# Patient Record
Sex: FEMALE
[De-identification: names, ages, dates, MRNs, and addresses within clinical notes are randomized per-mention and may not be internally consistent; named-entity substitution may affect disease eponyms.]

---

## 2018-08-27 ENCOUNTER — HOSPITAL ENCOUNTER (EMERGENCY)
Dept: HOSPITAL 5 - ED | Age: 53
Discharge: HOME | End: 2018-08-27
Payer: MEDICAID

## 2018-08-27 VITALS — SYSTOLIC BLOOD PRESSURE: 161 MMHG | DIASTOLIC BLOOD PRESSURE: 84 MMHG

## 2018-08-27 DIAGNOSIS — J45.909: Primary | ICD-10-CM

## 2018-08-27 DIAGNOSIS — M54.9: ICD-10-CM

## 2018-08-27 DIAGNOSIS — F17.200: ICD-10-CM

## 2018-08-27 DIAGNOSIS — I10: ICD-10-CM

## 2018-08-27 DIAGNOSIS — M19.90: ICD-10-CM

## 2018-08-27 DIAGNOSIS — Z79.899: ICD-10-CM

## 2018-08-27 DIAGNOSIS — G89.29: ICD-10-CM

## 2018-08-27 PROCEDURE — 99282 EMERGENCY DEPT VISIT SF MDM: CPT

## 2018-08-27 NOTE — EMERGENCY DEPARTMENT REPORT
ED General Adult HPI





- General


Chief complaint: Back Pain/Injury


Stated complaint: COUGH/BACK PAIN


Time Seen by Provider: 08/27/18 14:33


Source: patient


Mode of arrival: Ambulatory


Limitations: No Limitations





- Related Data


 Previous Rx's











 Medication  Instructions  Recorded  Last Taken  Type


 


ALBUTEROL Inhaler(NF) [VENTOLIN 2 puff IH Q4HR PRN #1 inha 08/27/18 Unknown Rx





Inhaler(NF)]    


 


Benzonatate [Tessalon Perles] 200 mg PO Q8HR #15 capsule 08/27/18 Unknown Rx


 


HYDROcodone/ACETAMINOPHEN [Norco 1 each PO Q6HR PRN #12 tablet 08/27/18 Unknown 

Rx





5-325 Tablet]    


 


predniSONE [Deltasone] 50 mg PO QDAY #5 tab 08/27/18 Unknown Rx











 Allergies











Allergy/AdvReac Type Severity Reaction Status Date / Time


 


No Known Allergies Allergy   Unverified 08/27/18 13:58














ED Review of Systems


ROS: 


Stated complaint: COUGH/BACK PAIN


Other details as noted in HPI





Constitutional: denies: chills, fever


Eyes: denies: eye pain, eye discharge, vision change


ENT: denies: ear pain, throat pain


Respiratory: denies: cough, shortness of breath, wheezing


Cardiovascular: denies: chest pain, palpitations


Endocrine: no symptoms reported


Gastrointestinal: denies: abdominal pain, nausea, diarrhea


Genitourinary: denies: urgency, dysuria, discharge


Musculoskeletal: back pain.  denies: joint swelling, arthralgia


Skin: denies: rash, lesions


Neurological: denies: headache, weakness, paresthesias


Psychiatric: denies: anxiety, depression


Hematological/Lymphatic: denies: easy bleeding, easy bruising





ED Past Medical Hx





- Past Medical History


Previous Medical History?: Yes


Hx Hypertension: Yes


Hx Arthritis: Yes


Hx Asthma: Yes


Additional medical history: bronchitis.  chronic back pain





- Surgical History


Past Surgical History?: Yes


Additional Surgical History: cataract.  thyroid surgery





- Social History


Smoking Status: Current Every Day Smoker


Substance Use Type: None





- Medications


Home Medications: 


 Home Medications











 Medication  Instructions  Recorded  Confirmed  Last Taken  Type


 


ALBUTEROL Inhaler(NF) [VENTOLIN 2 puff IH Q4HR PRN #1 inha 08/27/18  Unknown Rx





Inhaler(NF)]     


 


Benzonatate [Tessalon Perles] 200 mg PO Q8HR #15 capsule 08/27/18  Unknown Rx


 


HYDROcodone/ACETAMINOPHEN [Norco 1 each PO Q6HR PRN #12 tablet 08/27/18  

Unknown Rx





5-325 Tablet]     


 


predniSONE [Deltasone] 50 mg PO QDAY #5 tab 08/27/18  Unknown Rx














ED Physical Exam





- General


Limitations: No Limitations


General appearance: alert, in no apparent distress





- Head


Head exam: Present: atraumatic, normocephalic





- Eye


Eye exam: Present: normal appearance, PERRL, EOMI





- ENT


ENT exam: Present: mucous membranes moist





- Neck


Neck exam: Present: normal inspection





- Respiratory


Respiratory exam: Present: normal lung sounds bilaterally.  Absent: respiratory 

distress, wheezes, rales





- Cardiovascular


Cardiovascular Exam: Present: regular rate, normal rhythm.  Absent: systolic 

murmur, diastolic murmur, rubs, gallop





- GI/Abdominal


GI/Abdominal exam: Present: soft, normal bowel sounds.  Absent: distended, 

tenderness





- Extremities Exam


Extremities exam: Present: normal inspection





- Back Exam


Back exam: Present: normal inspection





- Neurological Exam


Neurological exam: Present: alert, oriented X3, CN II-XII intact





- Psychiatric


Psychiatric exam: Present: normal affect, normal mood





- Skin


Skin exam: Present: warm, dry, intact, normal color.  Absent: rash





ED Course





 Vital Signs











  08/27/18





  13:58


 


Temperature 99.2 F


 


Pulse Rate 97 H


 


Respiratory 18





Rate 


 


Blood Pressure 165/85


 


O2 Sat by Pulse 96





Oximetry 














ED Medical Decision Making





- Medical Decision Making





Discussed plan of care with patient


Critical care attestation.: 


If time is entered above; I have spent that time in minutes in the direct care 

of this critically ill patient, excluding procedure time.








ED Disposition


Clinical Impression: 


 Bronchitis, Back pain





Disposition: DC-01 TO HOME OR SELFCARE


Is pt being admited?: No


Does the pt Need Aspirin: No


Condition: Stable


Instructions:  Acute Bronchitis (ED), Low Back Strain (ED)


Additional Instructions: 


return if worse


Prescriptions: 


ALBUTEROL Inhaler(NF) [VENTOLIN Inhaler(NF)] 2 puff IH Q4HR PRN #1 inha


 PRN Reason: Wheezing


Benzonatate [Tessalon Perles] 200 mg PO Q8HR #15 capsule


HYDROcodone/ACETAMINOPHEN [Norco 5-325 Tablet] 1 each PO Q6HR PRN #12 tablet


 PRN Reason: pain


predniSONE [Deltasone] 50 mg PO QDAY #5 tab


Referrals: 


PRIMARY CARE,MD [Primary Care Provider] - 3-5 Days


Time of Disposition: 15:14

## 2018-09-10 ENCOUNTER — HOSPITAL ENCOUNTER (EMERGENCY)
Dept: HOSPITAL 5 - ED | Age: 53
Discharge: LEFT BEFORE DISCHARGE | End: 2018-09-10
Payer: MEDICAID

## 2018-09-10 VITALS — DIASTOLIC BLOOD PRESSURE: 88 MMHG | SYSTOLIC BLOOD PRESSURE: 147 MMHG

## 2018-09-10 DIAGNOSIS — Z53.21: ICD-10-CM

## 2018-09-10 DIAGNOSIS — M54.5: Primary | ICD-10-CM

## 2019-12-17 ENCOUNTER — HOSPITAL ENCOUNTER (EMERGENCY)
Dept: HOSPITAL 5 - ED | Age: 54
Discharge: HOME | End: 2019-12-17
Payer: MEDICAID

## 2019-12-17 VITALS — DIASTOLIC BLOOD PRESSURE: 82 MMHG | SYSTOLIC BLOOD PRESSURE: 124 MMHG
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
## 2019-12-17 DIAGNOSIS — M54.42: ICD-10-CM

## 2019-12-17 DIAGNOSIS — F17.200: ICD-10-CM
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
## 2019-12-17 DIAGNOSIS — I10: ICD-10-CM

## 2019-12-17 DIAGNOSIS — M19.90: ICD-10-CM

## 2019-12-17 DIAGNOSIS — J45.909: ICD-10-CM

## 2019-12-17 DIAGNOSIS — S39.012A: Primary | ICD-10-CM

## 2019-12-17 DIAGNOSIS — Z79.899: ICD-10-CM

## 2019-12-17 PROCEDURE — 99283 EMERGENCY DEPT VISIT LOW MDM: CPT

## 2019-12-17 PROCEDURE — 96372 THER/PROPH/DIAG INJ SC/IM: CPT

## 2019-12-17 NOTE — EVENT NOTE
ED Screening Note


Date of service: 12/17/19


Time: 17:41


ED Screening Note: 


I pulled my back out this morning. She bent over couldn't move. Denies any falls

or trauma. Also c/o coughing





This initial assessment/diagnostic orders/clinical plan/treatment(s) is/are 

subject to change based on patients health status, clinical progression and re-

assessment by fellow clinical providers in the ED. Further treatment and workup 

at subsequent clinical providers discretion. Patient/guardian urged not to elope

from the ED as their condition may be serious if not clinically assessed and 

managed. 





Initial orders include:

## 2019-12-17 NOTE — EMERGENCY DEPARTMENT REPORT
ED Back Pain/Injury HPI





- General


Chief Complaint: Back Pain/Injury


Stated Complaint: BACK PAIN


Time Seen by Provider: 12/17/19 19:14


Source: EMS


Limitations: Physical Limitation





- History of Present Illness


Initial Comments: 





54-year-old  American female presents to the emergency room complaining of 

lower back pain that began after she bent down to feed her cat.  Patient states 

that she had been raking in the yard and went inside to feed the cat when she 

bends down [place] her back went out.  Patient states that the back pain is 

located in the lower back with radiation down her left leg.  States she has a 

history of arthritis.  Patient is taking nothing for her pain.  Patient denies 

any fall or injury.  Patient reports she does not have a primary care provider.


MD Complaint: back pain





- Related Data


                                  Previous Rx's











 Medication  Instructions  Recorded  Last Taken  Type


 


ALBUTEROL Inhaler(NF) [VENTOLIN 2 puff IH Q4HR PRN #1 inha 08/27/18 Unknown Rx





Inhaler(NF)]    


 


Benzonatate [Tessalon Perles] 200 mg PO Q8HR #15 capsule 08/27/18 Unknown Rx


 


HYDROcodone/ACETAMINOPHEN [Norco 1 each PO Q6HR PRN #12 tablet 08/27/18 Unknown 

Rx





5-325 Tablet]    


 


predniSONE [Deltasone] 50 mg PO QDAY #5 tab 08/27/18 Unknown Rx


 


Methocarbamol [Robaxin-750] 750 mg PO Q8H #20 tablet 10/10/18 Unknown Rx


 


predniSONE [Prednisone] 50 mg PO DAILY #5 tablet 10/10/18 Unknown Rx


 


traMADoL [Ultram] 50 mg PO Q6HR PRN #15 tablet 10/10/18 Unknown Rx


 


Naproxen 500 mg PO BID PRN #20 tablet 12/17/19 Unknown Rx











                                    Allergies











Allergy/AdvReac Type Severity Reaction Status Date / Time


 


No Known Allergies Allergy   Unverified 08/27/18 13:58














ED Review of Systems


ROS: 


Stated complaint: BACK PAIN


Other details as noted in HPI








ED Past Medical Hx





- Past Medical History


Previous Medical History?: Yes


Hx Hypertension: Yes


Hx Arthritis: Yes


Hx Asthma: Yes


Additional medical history: bronchitis.  chronic back pain.  sciatica





- Surgical History


Past Surgical History?: Yes


Additional Surgical History: cataract.  thyroid surgery





- Social History


Smoking Status: Current Every Day Smoker


Substance Use Type: None





- Medications


Home Medications: 


                                Home Medications











 Medication  Instructions  Recorded  Confirmed  Last Taken  Type


 


ALBUTEROL Inhaler(NF) [VENTOLIN 2 puff IH Q4HR PRN #1 inha 08/27/18  Unknown Rx





Inhaler(NF)]     


 


Benzonatate [Tessalon Perles] 200 mg PO Q8HR #15 capsule 08/27/18  Unknown Rx


 


HYDROcodone/ACETAMINOPHEN [Norco 1 each PO Q6HR PRN #12 tablet 08/27/18  Unknown

 Rx





5-325 Tablet]     


 


predniSONE [Deltasone] 50 mg PO QDAY #5 tab 08/27/18  Unknown Rx


 


Methocarbamol [Robaxin-750] 750 mg PO Q8H #20 tablet 10/10/18  Unknown Rx


 


predniSONE [Prednisone] 50 mg PO DAILY #5 tablet 10/10/18  Unknown Rx


 


traMADoL [Ultram] 50 mg PO Q6HR PRN #15 tablet 10/10/18  Unknown Rx


 


Naproxen 500 mg PO BID PRN #20 tablet 12/17/19  Unknown Rx














ED Physical Exam





- General


Limitations: Physical Limitation





ED Course





                                   Vital Signs











  12/17/19





  17:41


 


Temperature 98.4 F


 


Pulse Rate 92 H


 


Respiratory 16





Rate 


 


Blood Pressure 124/82


 


O2 Sat by Pulse 95





Oximetry 














ED Medical Decision Making





- Medical Decision Making





54-year-old  American female presents to the emergency room complaining of 

lower back pain that began after she bent down to feed her cat.  Patient states 

that she had been raking in the yard and went inside to feed the cat when she 

bends down [place] her back went out.  Patient states that the back pain is 

located in the lower back with radiation down her left leg.  States she has a 

history of arthritis.  Patient is taking nothing for her pain.  Patient denies 

any fall or injury.  Patient reports she does not have a primary care provider.











Patient will be given a Toradol injection of 30 mg IM and a referral to Dr. Slaughter


Critical care attestation.: 


If time is entered above; I have spent that time in minutes in the direct care 

of this critically ill patient, excluding procedure time.








ED Disposition


Clinical Impression: 


 Low back strain, Back pain with left-sided sciatica





Disposition: DC-01 TO HOME OR SELFCARE


Is pt being admited?: No


Does the pt Need Aspirin: No


Condition: Stable


Instructions:  Lumbar Radiculopathy (ED), Muscle Strain (ED)


Prescriptions: 


Naproxen 500 mg PO BID PRN #20 tablet


 PRN Reason: Pain , Severe (7-10)


Referrals: 


PRIMARY CARE,MD [Primary Care Provider] - 3-5 Days


NARCISA SLAUGHTER MD [Staff Physician] - 3-5 Days